# Patient Record
(demographics unavailable — no encounter records)

---

## 2025-01-26 NOTE — HISTORY OF PRESENT ILLNESS
[N] : Patient does not use contraception [Y] : Patient is sexually active [Monogamous (Male Partner)] : is monogamous with a male partner [FreeTextEntry1] : 34-year-old P0 LMP 01/04/2025 presents for annual gyn visit. Pt feels well and has no complaints. She has normal menses. She denies intermenstrual bleeding, itching, burning, or abnormal discharge. Patient is planning on conception of pregnancy. She has started taking PNV.   FMHx of paternal aunt breast cancer at age 40.           [PapSmeardate] : 01/2024

## 2025-01-26 NOTE — COUNSELING
[Breast Self Exam] : breast self exam [Preconception Care/ Fertility options] : preconception care, fertility options [STD (testing, results, tx)] : STD (testing, results, tx) [FreeTextEntry2] :  patient screened for depression - no signs of clinical depression. EPDS scores reviewed over the course of the visit. 5-10 minutes of face to face time. Follow up with changes in mood including other symptoms of anxiety.

## 2025-01-26 NOTE — PLAN
[FreeTextEntry1] : 35 y/o female here for annual exam  -Pap done today.   discussed the following recommendations: starting taking prenatal vitamins daily fertility awareness and having sex +/- 2-3 days around time of ovulation preconception genetic counseling optimizing exercise and diet covid vaccine MMR status and sti testing will rto in 6 months to a year if not pregnant. -Blood work done today.  -Carrier testing done today.  -Discussed preconception care.  -RTO for annual 1 year.

## 2025-01-26 NOTE — DISCUSSION/SUMMARY
[FreeTextEntry1] : This note was written by Nic Silverio on 01/22/2025 actively solely LORELEI Vasquez M.D.     All medical record entries made by the Scribe were at my, LORELEI Vasquez M.D. direction and personally dictated by me on 01/22/2025. I have personally reviewed the chart and agree that the record reflects my personal performance of the history, physical exam, assessment, and plan.

## 2025-01-26 NOTE — PHYSICAL EXAM
[Chaperone Present] : A chaperone was present in the examining room during all aspects of the physical examination [Appropriately responsive] : appropriately responsive [Alert] : alert [No Acute Distress] : no acute distress [No Lymphadenopathy] : no lymphadenopathy [Soft] : soft [Non-tender] : non-tender [Non-distended] : non-distended [No HSM] : No HSM [No Lesions] : no lesions [No Mass] : no mass [Oriented x3] : oriented x3 [Examination Of The Breasts] : a normal appearance [No Discharge] : no discharge [No Masses] : no breast masses were palpable [Labia Majora] : normal [Labia Minora] : normal [Normal] : normal [Uterine Adnexae] : normal [07308] : A chaperone was present during the pelvic exam. [FreeTextEntry2] : ALONA Bahena

## 2025-01-26 NOTE — PHYSICAL EXAM
[Chaperone Present] : A chaperone was present in the examining room during all aspects of the physical examination [Appropriately responsive] : appropriately responsive [Alert] : alert [No Acute Distress] : no acute distress [No Lymphadenopathy] : no lymphadenopathy [Soft] : soft [Non-tender] : non-tender [Non-distended] : non-distended [No HSM] : No HSM [No Lesions] : no lesions [No Mass] : no mass [Oriented x3] : oriented x3 [Examination Of The Breasts] : a normal appearance [No Discharge] : no discharge [No Masses] : no breast masses were palpable [Labia Majora] : normal [Labia Minora] : normal [Normal] : normal [Uterine Adnexae] : normal [05425] : A chaperone was present during the pelvic exam. [FreeTextEntry2] : ALONA Bahena

## 2025-04-03 NOTE — HISTORY OF PRESENT ILLNESS
[FreeTextEntry1] : Pt is a 34 year old patient, LMP 3/30/25.Pt complains of having brown and red discharge. February normal period, March started to experience symptoms of brown and red discharge. Symptoms have been consistent. Hasn't done a sonogram. Birth control are condoms  [PapSmeardate] : 01/25

## 2025-04-03 NOTE — SIGNATURES
[TextEntry] :  This note was written by Xiao Bacon  on 04/03/2025 actively solely Domingo Cunha M.D. All medical record entries made by this scribe at my, Domingo Cunha M.D direction and personally dictated by me on 04/03/2025. I have personally reviewed the chart and agree that the record reflects my personal performance of the history, physical exam, assessment, and plan